# Patient Record
Sex: MALE | Race: WHITE | Employment: FULL TIME | URBAN - METROPOLITAN AREA
[De-identification: names, ages, dates, MRNs, and addresses within clinical notes are randomized per-mention and may not be internally consistent; named-entity substitution may affect disease eponyms.]

---

## 2018-11-29 ENCOUNTER — OFFICE VISIT (OUTPATIENT)
Dept: ORTHOPEDICS | Facility: CLINIC | Age: 63
End: 2018-11-29
Payer: COMMERCIAL

## 2018-11-29 ENCOUNTER — RADIANT APPOINTMENT (OUTPATIENT)
Dept: GENERAL RADIOLOGY | Facility: CLINIC | Age: 63
End: 2018-11-29
Attending: FAMILY MEDICINE
Payer: COMMERCIAL

## 2018-11-29 VITALS
DIASTOLIC BLOOD PRESSURE: 89 MMHG | HEIGHT: 70 IN | BODY MASS INDEX: 25.05 KG/M2 | WEIGHT: 175 LBS | SYSTOLIC BLOOD PRESSURE: 136 MMHG

## 2018-11-29 DIAGNOSIS — M79.642 LEFT HAND PAIN: ICD-10-CM

## 2018-11-29 DIAGNOSIS — S62.617A CLOSED DISPLACED FRACTURE OF PROXIMAL PHALANX OF LEFT LITTLE FINGER, INITIAL ENCOUNTER: Primary | ICD-10-CM

## 2018-11-29 PROCEDURE — 99203 OFFICE O/P NEW LOW 30 MIN: CPT | Mod: 25 | Performed by: FAMILY MEDICINE

## 2018-11-29 PROCEDURE — 73130 X-RAY EXAM OF HAND: CPT | Mod: LT

## 2018-11-29 PROCEDURE — 29075 APPL CST ELBW FNGR SHORT ARM: CPT | Mod: LT | Performed by: FAMILY MEDICINE

## 2018-11-29 NOTE — LETTER
[facility]  [address]  [place]  [address]  [phone]          2018    Tucker Jimenez  [address]  [place] [id number] [phone] (home)     :     1955      To Whom it May Concern:    This patient has a significant left hand injury.  Due to this he cannot use his hand until evaluation with a hand surgeon.      Please contact me for questions or concerns.    Sincerely,    Choco Willingham

## 2018-11-29 NOTE — LETTER
11/29/2018         RE: Tucker Jimenez  [address]  [place] [address]        Dear Colleague,    Thank you for referring your patient, Tucker Jimenez, to the [facility]. Please see a copy of my visit note below.    ASSESSMENT & PLAN  Tucker was seen today for pain.    Diagnoses and all orders for this visit:    Closed displaced fracture of proximal phalanx of left little finger, initial encounter  -     Cancel: ORTHO  REFERRAL  -     ORTHO  REFERRAL    Left hand pain  -     XR Hand Left G/E 3 Views; Future    Other orders  -     Cast application    Patient is a 63-year-old male presenting for initial evaluation of an injury occurring 1 month ago while at work while he was trying to unload a heavy box with direct impact over the fifth MCP joint.  X-rays showing a displaced proximal radial sided fifth phalangeal fracture covering 50% of the joint.  Given has been 1 month and not showing significant interval healing there is a concern for nonunion at this time.  Will refer patient to hand surgeon on a stat basis and place in ulnar gutter splint.  Patient understands and agrees with plan.    -----    SUBJECTIVE  Tucker Jimenez is a/an 63 year old Right handed male who is seen as a self referral for evaluation of left hand pain. The patient is seen by themselves.    Onset: 1 month(s) ago. Patient describes injury as reaching for box, fingers caught between box and metal railing on ladder.  Injury occurred at work.  Pt thought it was getting better but now worsened with usage.  Has difficulty lifting 2/2 pain.  No meds currently.    Location of Pain: left hand   Rating of Pain at worst: 9/10  Rating of Pain Currently: 0/10  Worsened by: finger flexion   Better with: compression wrap   Treatments tried: ice  Associated symptoms: no distal numbness or tingling, swelling  Orthopedic history: NO  Relevant surgical history: NO  History reviewed. No pertinent past  "medical history.  Social History     Social History     Marital status: Single     Spouse name: N/A     Number of children: N/A     Years of education: N/A     Social History Main Topics     Smoking status: Not on file     Smokeless tobacco: Not on file     Alcohol use Not on file     Drug use: Not on file     Sexual activity: Not on file     Other Topics Concern     Not on file     Social History Narrative         Patient's past medical, surgical, social, and family histories were reviewed today and no changes are noted.    REVIEW OF SYSTEMS:  10 point ROS is negative other than symptoms noted above in HPI, Past Medical History or as stated below  Constitutional: NEGATIVE for fever, chills, change in weight  Skin: NEGATIVE for worrisome rashes, moles or lesions  GI/: NEGATIVE for bowel or bladder changes  Neuro: NEGATIVE for weakness, dizziness or paresthesias    OBJECTIVE:  /89  Ht 5' 9.5\" (1.765 m)  Wt 175 lb (79.4 kg)  BMI 25.47 kg/m2   General: healthy, alert and in no distress  HEENT: no scleral icterus or conjunctival erythema  Skin: no suspicious lesions or rash. No jaundice.  CV: regular rhythm by palpation  Resp: normal respiratory effort without conversational dyspnea   Psych: normal mood and affect  Gait: normal steady gait with appropriate coordination and balance  Neuro: normal light touch sensory exam of the bilateral hands.    MSK:  LEFT HAND  Inspection:  Swelling over the fifth MCP joint  Palpation:   Carpals: normal   Metacarpals: 5th metacarpal TTP   Thumb: normal   Fingers: proximal phalanx, MCP joint of 5th digit  Range of Motion:  Limited flexion and extension of the left fifth digit otherwise normal finger cascade without any rotational abnormality in any of the fingers.  Intact flexion and extension of the PIP and DIP joint of all the fingers  Strength:  Intact flexion and extension strength in digits 1 through 3, limited in digits 4 and 5 secondary to pain at the fifth " MCP.  Special Tests:    Positive: none    Negative: flexor digitorum superficialis testing, flexor digitorum profundus testing    Independent visualization of the below image:  No results found for this or any previous visit (from the past 24 hour(s)).  Image Type: Hand 3 vw  Comparison: none  Findings: displaced prox radial sided phalangeal fx involving 50% of the joint  Impression: displaced prox radial sided phalangeal fx involving 50% of the joint    Cast/splint application  Date/Time: 11/29/2018 7:15 PM  Performed by: CHOCO MORILLO  Authorized by: CHOCO MORILLO     Consent:     Consent obtained:  Verbal    Consent given by:  Patient    Risks discussed:  Discoloration, numbness, pain and swelling    Alternatives discussed:  Referral and delayed treatment  Pre-procedure details:     Sensation:  Normal  Procedure details:     Laterality:  Left    Location:  Hand    Hand:  L hand    Cast type:  Ulnar gutter    Supplies:  Fiberglass  Post-procedure details:     Pain:  Improved    Sensation:  Normal    Patient tolerance of procedure:  Tolerated well, no immediate complications    Patient provided with cast or splint care instructions: Yes            Patient's conditions were thoroughly discussed during today's visit with greater than 50% of the visit spent counseling the patient with total time spent face-to-face with the patient being 30 minutes.    Choco Morillo MD [facility]        Again, thank you for allowing me to participate in the care of your patient.        Sincerely,        Choco Morillo MD

## 2018-11-30 NOTE — PATIENT INSTRUCTIONS
Finger Fracture, Closed  You have a broken finger (fracture). This causes local pain, swelling, and bruising. This injury usually takes about 4 to 6 weeks to heal, but can take longer in some cases. Finger injuries are often treated with a splint or cast, or by taping the injured finger to the next one (layne taping). This protects the injured finger and holds the bone in position while it heals. More serious fractures may need surgery.     If the fingernail has been severely injured, it will probably fall off in 1 to 2 weeks. A new fingernail will usually start to grow back within a month.  Home care  Follow these guidelines when caring for yourself at home:    Keep your hand elevated to reduce pain and swelling. When sitting or lying down keep your arm above the level of your heart. You can do this by placing your arm on a pillow that rests on your chest or on a pillow at your side. This is most important during the first 2 days (48 hours) after the injury.    Put an ice pack on the injured area. Do this for 20 minutes every 1 to 2 hours the first day for pain relief. You can make an ice pack by wrapping a plastic bag of ice cubes in a thin towel. As the ice melts, be careful that the cast or splint doesn t get wet. Continue using the ice pack 3 to 4 times a day until the pain and swelling go away.    Keep the cast or splint completely dry at all times. Bathe with your cast or splint out of the water. Protect it with a large plastic bag, rubber-banded at the top end. If a fiberglass cast or splint gets wet, you can dry it with a hair dryer.    If layne tape was put on and it becomes wet or dirty, change it. You may replace it with paper, plastic, or cloth tape. Cloth tape and paper tapes must be kept dry. Keep the layne tape in place for at least 4 weeks.    You may use acetaminophen or ibuprofen to control pain, unless another pain medicine was prescribed. If you have chronic liver or kidney disease, talk with  your healthcare provider before using these medicines. Also talk with your provider if you ve had a stomach ulcer or gastrointestinal bleeding.    Don t put creams or objects under the cast if you have itching.  Follow-up care  Follow up with your healthcare provider, or as advised. This is to make sure the bone is healing the way it should.  X-rays may be taken. You will be told of any new findings that may affect your care.  When to seek medical advice  Call your healthcare provider right away if any of these occur:    The plaster cast or splint becomes wet or soft    The cast or splint cracks    The fiberglass cast or splint stays wet for more than 24 hours    Pain or swelling gets worse    Redness, warmth, swelling, drainage from the wound, or foul odor from a cast or splint    Finger becomes more cold, blue, numb, or tingly    You can t move your finger    The skin around the cast or splint becomes red    Fever of 100.4 F (38 C) or higher, or as directed by your healthcare provider  Date Last Reviewed: 2/1/2017 2000-2018 [employer]. [address] [place] [id number]. All rights reserved. This information is not intended as a substitute for professional medical care. Always follow your healthcare professional's instructions.

## 2018-11-30 NOTE — PROGRESS NOTES
ASSESSMENT & PLAN  Tucker was seen today for pain.    Diagnoses and all orders for this visit:    Closed displaced fracture of proximal phalanx of left little finger, initial encounter  -     Cancel: ORTHO  REFERRAL  -     ORTHO  REFERRAL    Left hand pain  -     XR Hand Left G/E 3 Views; Future    Other orders  -     Cast application    Patient is a 63-year-old male presenting for initial evaluation of an injury occurring 1 month ago while at work while he was trying to unload a heavy box with direct impact over the fifth MCP joint.  X-rays showing a displaced proximal radial sided fifth phalangeal fracture covering 50% of the joint.  Given has been 1 month and not showing significant interval healing there is a concern for nonunion at this time.  Will refer patient to hand surgeon on a stat basis and place in ulnar gutter splint.  Patient understands and agrees with plan.    -----    SUBJECTIVE  Tucker Jimenez is a/an 63 year old Right handed male who is seen as a self referral for evaluation of left hand pain. The patient is seen by themselves.    Onset: 1 month(s) ago. Patient describes injury as reaching for box, fingers caught between box and metal railing on ladder.  Injury occurred at work.  Pt thought it was getting better but now worsened with usage.  Has difficulty lifting 2/2 pain.  No meds currently.    Location of Pain: left hand   Rating of Pain at worst: 9/10  Rating of Pain Currently: 0/10  Worsened by: finger flexion   Better with: compression wrap   Treatments tried: ice  Associated symptoms: no distal numbness or tingling, swelling  Orthopedic history: NO  Relevant surgical history: NO  History reviewed. No pertinent past medical history.  Social History     Social History     Marital status: Single     Spouse name: N/A     Number of children: N/A     Years of education: N/A     Social History Main Topics     Smoking status: Not on file     Smokeless tobacco: Not on file      "Alcohol use Not on file     Drug use: Not on file     Sexual activity: Not on file     Other Topics Concern     Not on file     Social History Narrative         Patient's past medical, surgical, social, and family histories were reviewed today and no changes are noted.    REVIEW OF SYSTEMS:  10 point ROS is negative other than symptoms noted above in HPI, Past Medical History or as stated below  Constitutional: NEGATIVE for fever, chills, change in weight  Skin: NEGATIVE for worrisome rashes, moles or lesions  GI/: NEGATIVE for bowel or bladder changes  Neuro: NEGATIVE for weakness, dizziness or paresthesias    OBJECTIVE:  /89  Ht 5' 9.5\" (1.765 m)  Wt 175 lb (79.4 kg)  BMI 25.47 kg/m2   General: healthy, alert and in no distress  HEENT: no scleral icterus or conjunctival erythema  Skin: no suspicious lesions or rash. No jaundice.  CV: regular rhythm by palpation  Resp: normal respiratory effort without conversational dyspnea   Psych: normal mood and affect  Gait: normal steady gait with appropriate coordination and balance  Neuro: normal light touch sensory exam of the bilateral hands.    MSK:  LEFT HAND  Inspection:  Swelling over the fifth MCP joint  Palpation:   Carpals: normal   Metacarpals: 5th metacarpal TTP   Thumb: normal   Fingers: proximal phalanx, MCP joint of 5th digit  Range of Motion:  Limited flexion and extension of the left fifth digit otherwise normal finger cascade without any rotational abnormality in any of the fingers.  Intact flexion and extension of the PIP and DIP joint of all the fingers  Strength:  Intact flexion and extension strength in digits 1 through 3, limited in digits 4 and 5 secondary to pain at the fifth MCP.  Special Tests:    Positive: none    Negative: flexor digitorum superficialis testing, flexor digitorum profundus testing    Independent visualization of the below image:  No results found for this or any previous visit (from the past 24 hour(s)).  Image Type: " Hand 3 vw  Comparison: none  Findings: displaced prox radial sided phalangeal fx involving 50% of the joint  Impression: displaced prox radial sided phalangeal fx involving 50% of the joint    Cast/splint application  Date/Time: 11/29/2018 7:15 PM  Performed by: CHOCO MORILLO  Authorized by: CHOCO MORILLO     Consent:     Consent obtained:  Verbal    Consent given by:  Patient    Risks discussed:  Discoloration, numbness, pain and swelling    Alternatives discussed:  Referral and delayed treatment  Pre-procedure details:     Sensation:  Normal  Procedure details:     Laterality:  Left    Location:  Hand    Hand:  L hand    Cast type:  Ulnar gutter    Supplies:  Fiberglass  Post-procedure details:     Pain:  Improved    Sensation:  Normal    Patient tolerance of procedure:  Tolerated well, no immediate complications    Patient provided with cast or splint care instructions: Yes            Patient's conditions were thoroughly discussed during today's visit with greater than 50% of the visit spent counseling the patient with total time spent face-to-face with the patient being 30 minutes.    Choco Morillo MD [facility]

## 2023-06-11 NOTE — MR AVS SNAPSHOT
After Visit Summary   11/29/2018    Tucker Jimenez    MRN: [id number]           Patient Information     Date Of Birth          1955        Visit Information        Provider Department      11/29/2018 6:40 PM Choco Willingham MD [facility]        Today's Diagnoses     Closed displaced fracture of proximal phalanx of left little finger, initial encounter    -  1    Left hand pain          Care Instructions      Finger Fracture, Closed  You have a broken finger (fracture). This causes local pain, swelling, and bruising. This injury usually takes about 4 to 6 weeks to heal, but can take longer in some cases. Finger injuries are often treated with a splint or cast, or by taping the injured finger to the next one (layne taping). This protects the injured finger and holds the bone in position while it heals. More serious fractures may need surgery.     If the fingernail has been severely injured, it will probably fall off in 1 to 2 weeks. A new fingernail will usually start to grow back within a month.  Home care  Follow these guidelines when caring for yourself at home:    Keep your hand elevated to reduce pain and swelling. When sitting or lying down keep your arm above the level of your heart. You can do this by placing your arm on a pillow that rests on your chest or on a pillow at your side. This is most important during the first 2 days (48 hours) after the injury.    Put an ice pack on the injured area. Do this for 20 minutes every 1 to 2 hours the first day for pain relief. You can make an ice pack by wrapping a plastic bag of ice cubes in a thin towel. As the ice melts, be careful that the cast or splint doesn t get wet. Continue using the ice pack 3 to 4 times a day until the pain and swelling go away.    Keep the cast or splint completely dry at all times. Bathe with your cast or splint out of the water. Protect it with a large plastic bag, rubber-banded at  "6/11/2023  I, Violetta Fleischer, MD, saw and evaluated the patient  I have discussed the patient with the resident/non-physician practitioner and agree with the resident's/non-physician practitioner's findings, Plan of Care, and MDM as documented in the resident's/non-physician practitioner's note, except where noted  All available labs and Radiology studies were reviewed  I was present for key portions of any procedure(s) performed by the resident/non-physician practitioner and I was immediately available to provide assistance  At this point I agree with the current assessment done in the Emergency Department  I have conducted an independent evaluation of this patient a history and physical is as follows:    21-year-old male with past surgical history of left shoulder total reverse arthroplasty on 4/4 with Dr Jenny Fisher, complicated by left shoulder cellulitis in early May, requiring admission and uncomplicated left shoulder irrigation and debridement along with IV antibiotics  Yesterday, patient developed redness around the surgical incision and today, there is a fluctuant painful mass at the center of the incision  No fevers or chills  No chest pain  No shortness of breath  Patient is able to range his shoulder without pain, except whenever he is flexing right shoulder  He experiences pain which he attributes to the superficial infection  /51 (BP Location: Left arm)   Pulse 66   Temp 98 1 °F (36 7 °C) (Oral)   Resp 18   Ht 5' 11\" (1 803 m)   Wt 113 kg (250 lb)   SpO2 98%   BMI 34 87 kg/m²      Vital signs and nursing notes reviewed    CONSTITUTIONAL: male appearing stated age resting in bed, in no acute distress  HEENT: atraumatic, normocephalic  Sclera anicteric, conjunctiva are not injected  Moist oral mucosa  CARDIOVASCULAR/CHEST: RRR, no M/R/G  2+ radial pulses  PULMONARY: Breathing comfortably on RA  Breath sounds are equal and clear to auscultation  ABDOMEN: non-distended     MSK: " Examination of left upper extremity reveals a healed surgical incision to left shoulder consistent with prior arthroplasty  The incision is intact  There is erythema surrounding the incision as well as a 2 cm fluctuant mass at the center of the incision  Patient is able to demonstrate active range of motion that is appropriate for the patient's stage of healing after the arthroplasty  NEURO: Awake, alert, and oriented x 3  Face symmetric  Moves all extremities spontaneously  No focal neurologic deficits  SKIN: Warm, appears well-perfused  MENTAL STATUS: Normal affect    Labs Reviewed - No data to display  No orders to display         ED Course     78-year-old male presenting with left shoulder surgical incision swelling and fluctuant mass consistent with abscess  Vital signs reviewed, afebrile and within normal limits  Medical record including orthopedics notes in April and May of this year reviewed  In May, patient underwent uncomplicated debridement of left shoulder which revealed that the infection did not extend down to the joint capsule and the hardware  Today, I have a suspicion that patient has recurrence of skin/soft tissue infection and, given absence of pain with ranging of left shoulder, I am hopeful that patient again does not have extension of infection down into the prosthetic joint  We are consulting orthopedics  Anticipate that patient will require admission, another superficial/uncomplicated irrigation, and IV antibiotics  the top end. If a fiberglass cast or splint gets wet, you can dry it with a hair dryer.    If layne tape was put on and it becomes wet or dirty, change it. You may replace it with paper, plastic, or cloth tape. Cloth tape and paper tapes must be kept dry. Keep the layne tape in place for at least 4 weeks.    You may use acetaminophen or ibuprofen to control pain, unless another pain medicine was prescribed. If you have chronic liver or kidney disease, talk with your healthcare provider before using these medicines. Also talk with your provider if you ve had a stomach ulcer or gastrointestinal bleeding.    Don t put creams or objects under the cast if you have itching.  Follow-up care  Follow up with your healthcare provider, or as advised. This is to make sure the bone is healing the way it should.  X-rays may be taken. You will be told of any new findings that may affect your care.  When to seek medical advice  Call your healthcare provider right away if any of these occur:    The plaster cast or splint becomes wet or soft    The cast or splint cracks    The fiberglass cast or splint stays wet for more than 24 hours    Pain or swelling gets worse    Redness, warmth, swelling, drainage from the wound, or foul odor from a cast or splint    Finger becomes more cold, blue, numb, or tingly    You can t move your finger    The skin around the cast or splint becomes red    Fever of 100.4 F (38 C) or higher, or as directed by your healthcare provider  Date Last Reviewed: 2/1/2017 2000-2018 [employer]. [address] [place] [address]. All rights reserved. This information is not intended as a substitute for professional medical care. Always follow your healthcare professional's instructions.                Follow-ups after your visit        Additional Services     ORTHO  REFERRAL       [facility] is referring you to the Orthopedic  Services at [facility].       The  Representative will assist you in the coordination of your Orthopedic and Musculoskeletal Care as prescribed by your physician.    The  Representative will call you within 1 business day to help schedule your appointment, or you may contact the  Representative at:    All areas ~ [phone]     Type of Referral : Surgical / Specialist Hand surgery    Timeframe requested: 1 - 2 days    Coverage of these services is subject to the terms and limitations of your health insurance plan.  Please call member services at your health plan with any benefit or coverage questions.      If X-rays, CT or MRI's have been performed, please contact the facility where they were done to arrange for , prior to your scheduled appointment.  Please bring this referral request to your appointment and present it to your specialist.                  Follow-up notes from your care team     Return if symptoms worsen or fail to improve.      Who to contact     If you have questions or need follow up information about today's clinic visit or your schedule please contact [facility] directly at [phone].  Normal or non-critical lab and imaging results will be communicated to you by MyChart, letter or phone within 4 business days after the clinic has received the results. If you do not hear from us within 7 days, please contact the clinic through [employer] or phone. If you have a critical or abnormal lab result, we will notify you by phone as soon as possible.  Submit refill requests through [employer] or call your pharmacy and they will forward the refill request to us. Please allow 3 business days for your refill to be completed.          Additional Information About Your Visit        [employer] Information     [employer] lets you send messages to your doctor, view your test results, renew your prescriptions, schedule appointments and more. To sign up, go to  [email or web address] . Click on \"Log in\" on the left side of the screen, which will take you to the Welcome page. Then click on \"Sign up Now\" on the right side of the page.     You will be asked to enter the access code listed below, as well as some personal information. Please follow the directions to create your username and password.     Your access code is: [email or web address]  Expires: 2019  7:07 PM     Your access code will  in 90 days. If you need help or a new code, please call your [facility] clinic or [phone].        Care EveryWhere ID     This is your Care EveryWhere ID. This could be used by other organizations to access your [facility] medical records  [id number]        Your Vitals Were     Height BMI (Body Mass Index)                5' 9.5\" (1.765 m) 25.47 kg/m2           Blood Pressure from Last 3 Encounters:   18 136/89    Weight from Last 3 Encounters:   18 175 lb (79.4 kg)              We Performed the Following     ORTHO  REFERRAL        Primary Care Provider Fax #    Physician No Ref-Primary [phone]       No address on file        Equal Access to Services     PRISCILA HARRINGTON : Renay Siegel, waaxda lumeiradaha, qaybta kaalmada adeegyada, rosaura orozco . So [facility] [phone].    ATENCIÓN: Si habla español, tiene a belcher disposición servicios gratuitos de asistencia lingüística. Llame al [phone].    We comply with applicable federal civil rights laws and [place] laws. We do not discriminate on the basis of race, color, national origin, age, disability, sex, sexual orientation, or gender identity.            Thank you!     Thank you for choosing [facility]  for your care. Our goal is always to provide you with excellent care. Hearing back from our patients is one way we can continue to improve our services. Please take a few minutes to complete the written survey that you may receive in the " mail after your visit with us. Thank you!             Your Updated Medication List - Protect others around you: Learn how to safely use, store and throw away your medicines at www.disposemymeds.org.      Notice  As of 11/29/2018  7:07 PM    You have not been prescribed any medications.